# Patient Record
Sex: FEMALE | Race: BLACK OR AFRICAN AMERICAN | NOT HISPANIC OR LATINO | URBAN - METROPOLITAN AREA
[De-identification: names, ages, dates, MRNs, and addresses within clinical notes are randomized per-mention and may not be internally consistent; named-entity substitution may affect disease eponyms.]

---

## 2021-05-15 ENCOUNTER — INPATIENT (INPATIENT)
Facility: HOSPITAL | Age: 41
LOS: 4 days | Discharge: HOME | End: 2021-05-20
Attending: PSYCHIATRY & NEUROLOGY | Admitting: PSYCHIATRY & NEUROLOGY
Payer: MEDICAID

## 2021-05-15 VITALS
DIASTOLIC BLOOD PRESSURE: 83 MMHG | OXYGEN SATURATION: 99 % | TEMPERATURE: 99 F | SYSTOLIC BLOOD PRESSURE: 148 MMHG | RESPIRATION RATE: 15 BRPM | HEART RATE: 104 BPM

## 2021-05-15 PROCEDURE — 99285 EMERGENCY DEPT VISIT HI MDM: CPT

## 2021-05-15 NOTE — ED BEHAVIORAL HEALTH ASSESSMENT NOTE - RISK ASSESSMENT
Chronic risk factors:  psychosocial stressors; single. Protective factors: young; healthy;  no history of hospitalizations, no formal diagnosis; no suicide attempts; no self-injurious behavior; no hx of aggression/violence; no legal issues; motivated for help; articulate; strong family support; access to health services. No acute risk factors identified Low Acute Suicide Risk
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
## 2021-05-15 NOTE — ED PROVIDER NOTE - CLINICAL SUMMARY MEDICAL DECISION MAKING FREE TEXT BOX
Labs, Urine Tox/UA, EKG, CT head.    Psychiatric consultation called. 41 y/o F .  Labs, Urine Tox/UA, EKG, CT head.    Psychiatric consultation called. Medical evaluation performed. There is no clinical evidence of intoxication or any acute medical problem requiring immediate intervention. Patient is awaiting psychiatric consultation. Final disposition will be determined by psychiatrist.

## 2021-05-15 NOTE — ED BEHAVIORAL HEALTH ASSESSMENT NOTE - SUMMARY
Patient is a 40 year old , AA female, domiciled with her mother; noncaregiver; employed full time as a HHA; Denies PPH; no prior hospitalizations; no known suicide attempts; no known history of violence or arrests; no active substance abuse or known history of complicated withdrawal; Denies PMH; Patient was brought in by EMS, after she called 911 c/o spirits attacking her.  Patient with auditory hallucinations, olfactory hallucinations, paranoid and referential delusions with a decline is self care and daily functioning.  She denies SI/P/I, HI/P/I;  Pending full medical clearance for final determination of treatment. Patient is a 40 year old , AA female, domiciled with her mother; noncaregiver; employed full time as a HHA; Denies PPH; no prior hospitalizations; no known suicide attempts; no known history of violence or arrests; no active substance abuse or known history of complicated withdrawal; Denies PMH; Patient was brought in by EMS, after she called 911 c/o spirits attacking her.  Patient with auditory hallucinations, olfactory hallucinations, paranoid and referential delusions with a decline is self care and daily functioning.  She denies SI/P/I, HI/P/I;  Patient to be admitted on 9.13 for stabilization.

## 2021-05-15 NOTE — ED BEHAVIORAL HEALTH ASSESSMENT NOTE - HPI (INCLUDE ILLNESS QUALITY, SEVERITY, DURATION, TIMING, CONTEXT, MODIFYING FACTORS, ASSOCIATED SIGNS AND SYMPTOMS)
Patient is a 40 year old , AA female, domiciled with her mother; noncaregiver; employed full time as a HHA; Denies PPH; no prior hospitalizations; no known suicide attempts; no known history of violence or arrests; no active substance abuse or known history of complicated withdrawal; Denies PMH; Patient was brought in by EMS, after she called 911 c/o spirits attacking her.  On current evaluation, patient reports that for the past 4 years, spirits are attacking her.  Reports that the spirits are "choking my neck, pulling my neck and changing the shape of my collarbone, squeezing my forehead, my hair is locked up, they are raping me".  Patient reports that when she watches tv, the spirits watch with her and "talk about what I see".  She identifies them as a "loud group of voices", telling her she is ugly and has no money.  Reports that they also "smell foul" and are changing the shape of her body parts.  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  Reports she also can't shower because of them and the harassment.   Denies issues with eating or sleeping.  Reports anergia and feels hopeless in regards to "the spirits" and their harassment.    The patient denies depression or other significant mood symptoms.  Specifically, the patient denies manic symptoms, past and present.  The patient denies  visual hallucinations.  The patient denies suicidal ideation, homicidal ideation, intent, or plan.  Collateral: June, l/m, awaiting callback

## 2021-05-15 NOTE — ED ADULT NURSE NOTE - OBJECTIVE STATEMENT
Pt BIB EMS from home for auditory hallucinations x4 years.  Pt is alert and oriented and states that the spirits are attacking her, strangling her and can't breathe. Pt denies SI/HI, VH, drugs or ETOH.  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  Mom is aware that she is here.

## 2021-05-15 NOTE — ED BEHAVIORAL HEALTH ASSESSMENT NOTE - OTHER PAST PSYCHIATRIC HISTORY (INCLUDE DETAILS REGARDING ONSET, COURSE OF ILLNESS, INPATIENT/OUTPATIENT TREATMENT)
Reports no history of psychiatric treatment, hospitalizations, suicide attempts or self harming behaviors

## 2021-05-15 NOTE — ED PROVIDER NOTE - OBJECTIVE STATEMENT
41 y/o F BIBA secondary to auditorily  hallucinations and sensation that " spirits are choking me for 4 years" . Appears paranoid and internally preoccupied.  No evidence of physical injuries, broken skin or deformities.   Denies falling,  punching or kicking any objects. Denies pain ,SOB, fever , chills chest/ abdominal discomfort. Denies SI/HI/VH. .  Denies recent use of alcohol or illicit drugs. 41 y/o F BIBA secondary to auditorily  hallucinations and sensation that " spirits are choking me for 4 years" . Appears paranoid and internally preoccupied.  No evidence of physical injuries, broken skin or deformities.  States that her injuries are in her phones.  Denies falling,  punching or kicking any objects. Denies pain ,SOB, fever , chills chest/ abdominal discomfort. Denies SI/HI/VH. .  Denies recent use of alcohol or illicit drugs.

## 2021-05-15 NOTE — ED ADULT NURSE NOTE - NSFALLRSKASSESSTYPE_ED_ALL_ED
Add 52830 Cpt? (Important Note: In 2017 The Use Of 19256 Is Being Tracked By Cms To Determine Future Global Period Reimbursement For Global Periods): yes Body Location Override (Optional - Billing Will Still Be Based On Selected Body Map Location If Applicable): right posterior shoulder Detail Level: Detailed Initial (On Arrival)

## 2021-05-15 NOTE — ED BEHAVIORAL HEALTH ASSESSMENT NOTE - DESCRIPTION
denies 40 year old DAAF, domiciled with her mother; works f/t as a HHA; anxious; cooperative  Vital Signs Last 24 Hrs  T(C): 37 (15 May 2021 23:14), Max: 37 (15 May 2021 23:14)  T(F): 98.6 (15 May 2021 23:14), Max: 98.6 (15 May 2021 23:14)  HR: 104 (15 May 2021 23:14) (104 - 104)  BP: 148/83 (15 May 2021 23:14) (148/83 - 148/83)  BP(mean): --  RR: 15 (15 May 2021 23:14) (15 - 15)  SpO2: 99% (15 May 2021 23:14) (99% - 99%)

## 2021-05-15 NOTE — ED BEHAVIORAL HEALTH ASSESSMENT NOTE - CASE SUMMARY
Patient is a 40 year old , AA female, domiciled with her mother; noncaregiver; employed full time as a HHA; Denies PPH; no prior hospitalizations; no known suicide attempts; no known history of violence or arrests; no active substance abuse or known history of complicated withdrawal; Denies PMH; Patient was brought in by EMS, after she called 911 c/o spirits attacking her.  Patient with auditory hallucinations, olfactory hallucinations, paranoid and referential delusions with a decline is self care and daily functioning. HE believes that spirits are attacking her, and lately she hasn't been able to function, she stopped working and scared to take showers.  She denies SI/P/I, HI/P/I;  Patient to be admitted on 9.13 for stabilization.

## 2021-05-15 NOTE — ED PROVIDER NOTE - PROGRESS NOTE DETAILS
Thaddeus GUEVARA: Pt signed out to me.  She has been evaluated by psychiatry and will require admission.  Labs and CT reviewed, pt is medically cleared for psych admission.

## 2021-05-16 DIAGNOSIS — F29 UNSPECIFIED PSYCHOSIS NOT DUE TO A SUBSTANCE OR KNOWN PHYSIOLOGICAL CONDITION: ICD-10-CM

## 2021-05-16 LAB
ALBUMIN SERPL ELPH-MCNC: 4.5 G/DL — SIGNIFICANT CHANGE UP (ref 3.3–5)
ALP SERPL-CCNC: 69 U/L — SIGNIFICANT CHANGE UP (ref 40–120)
ALT FLD-CCNC: 15 U/L — SIGNIFICANT CHANGE UP (ref 4–33)
ANION GAP SERPL CALC-SCNC: 11 MMOL/L — SIGNIFICANT CHANGE UP (ref 7–14)
APPEARANCE UR: ABNORMAL
AST SERPL-CCNC: 15 U/L — SIGNIFICANT CHANGE UP (ref 4–32)
BASOPHILS # BLD AUTO: 0.02 K/UL — SIGNIFICANT CHANGE UP (ref 0–0.2)
BASOPHILS NFR BLD AUTO: 0.2 % — SIGNIFICANT CHANGE UP (ref 0–2)
BILIRUB SERPL-MCNC: 0.3 MG/DL — SIGNIFICANT CHANGE UP (ref 0.2–1.2)
BILIRUB UR-MCNC: NEGATIVE — SIGNIFICANT CHANGE UP
BUN SERPL-MCNC: 12 MG/DL — SIGNIFICANT CHANGE UP (ref 7–23)
CALCIUM SERPL-MCNC: 9.7 MG/DL — SIGNIFICANT CHANGE UP (ref 8.4–10.5)
CHLORIDE SERPL-SCNC: 103 MMOL/L — SIGNIFICANT CHANGE UP (ref 98–107)
CO2 SERPL-SCNC: 22 MMOL/L — SIGNIFICANT CHANGE UP (ref 22–31)
COLOR SPEC: YELLOW — SIGNIFICANT CHANGE UP
COVID-19 NUCLEOCAPSID GAM AB INTERP: NEGATIVE — SIGNIFICANT CHANGE UP
COVID-19 NUCLEOCAPSID TOTAL GAM ANTIBODY RESULT: 0.08 INDEX — SIGNIFICANT CHANGE UP
CREAT SERPL-MCNC: 0.72 MG/DL — SIGNIFICANT CHANGE UP (ref 0.5–1.3)
DIFF PNL FLD: ABNORMAL
EOSINOPHIL # BLD AUTO: 0.09 K/UL — SIGNIFICANT CHANGE UP (ref 0–0.5)
EOSINOPHIL NFR BLD AUTO: 0.9 % — SIGNIFICANT CHANGE UP (ref 0–6)
GLUCOSE SERPL-MCNC: 91 MG/DL — SIGNIFICANT CHANGE UP (ref 70–99)
GLUCOSE UR QL: NEGATIVE — SIGNIFICANT CHANGE UP
HCG SERPL-ACNC: <5 MIU/ML — SIGNIFICANT CHANGE UP
HCT VFR BLD CALC: 38.7 % — SIGNIFICANT CHANGE UP (ref 34.5–45)
HGB BLD-MCNC: 12.5 G/DL — SIGNIFICANT CHANGE UP (ref 11.5–15.5)
IANC: 7.2 K/UL — SIGNIFICANT CHANGE UP (ref 1.5–8.5)
IMM GRANULOCYTES NFR BLD AUTO: 0.2 % — SIGNIFICANT CHANGE UP (ref 0–1.5)
KETONES UR-MCNC: ABNORMAL
LEUKOCYTE ESTERASE UR-ACNC: NEGATIVE — SIGNIFICANT CHANGE UP
LYMPHOCYTES # BLD AUTO: 2.28 K/UL — SIGNIFICANT CHANGE UP (ref 1–3.3)
LYMPHOCYTES # BLD AUTO: 22.2 % — SIGNIFICANT CHANGE UP (ref 13–44)
MCHC RBC-ENTMCNC: 27.3 PG — SIGNIFICANT CHANGE UP (ref 27–34)
MCHC RBC-ENTMCNC: 32.3 GM/DL — SIGNIFICANT CHANGE UP (ref 32–36)
MCV RBC AUTO: 84.5 FL — SIGNIFICANT CHANGE UP (ref 80–100)
MONOCYTES # BLD AUTO: 0.65 K/UL — SIGNIFICANT CHANGE UP (ref 0–0.9)
MONOCYTES NFR BLD AUTO: 6.3 % — SIGNIFICANT CHANGE UP (ref 2–14)
NEUTROPHILS # BLD AUTO: 7.2 K/UL — SIGNIFICANT CHANGE UP (ref 1.8–7.4)
NEUTROPHILS NFR BLD AUTO: 70.2 % — SIGNIFICANT CHANGE UP (ref 43–77)
NITRITE UR-MCNC: NEGATIVE — SIGNIFICANT CHANGE UP
NRBC # BLD: 0 /100 WBCS — SIGNIFICANT CHANGE UP
NRBC # FLD: 0 K/UL — SIGNIFICANT CHANGE UP
PCP SPEC-MCNC: SIGNIFICANT CHANGE UP
PH UR: 6 — SIGNIFICANT CHANGE UP (ref 5–8)
PLATELET # BLD AUTO: 302 K/UL — SIGNIFICANT CHANGE UP (ref 150–400)
POTASSIUM SERPL-MCNC: 3.6 MMOL/L — SIGNIFICANT CHANGE UP (ref 3.5–5.3)
POTASSIUM SERPL-SCNC: 3.6 MMOL/L — SIGNIFICANT CHANGE UP (ref 3.5–5.3)
PROT SERPL-MCNC: 7.8 G/DL — SIGNIFICANT CHANGE UP (ref 6–8.3)
PROT UR-MCNC: ABNORMAL
RBC # BLD: 4.58 M/UL — SIGNIFICANT CHANGE UP (ref 3.8–5.2)
RBC # FLD: 13.2 % — SIGNIFICANT CHANGE UP (ref 10.3–14.5)
SARS-COV-2 IGG+IGM SERPL QL IA: 0.08 INDEX — SIGNIFICANT CHANGE UP
SARS-COV-2 IGG+IGM SERPL QL IA: NEGATIVE — SIGNIFICANT CHANGE UP
SARS-COV-2 RNA SPEC QL NAA+PROBE: SIGNIFICANT CHANGE UP
SODIUM SERPL-SCNC: 136 MMOL/L — SIGNIFICANT CHANGE UP (ref 135–145)
SP GR SPEC: 1.03 — HIGH (ref 1.01–1.02)
TOXICOLOGY SCREEN, DRUGS OF ABUSE, SERUM RESULT: SIGNIFICANT CHANGE UP
TSH SERPL-MCNC: 3.03 UIU/ML — SIGNIFICANT CHANGE UP (ref 0.27–4.2)
UROBILINOGEN FLD QL: SIGNIFICANT CHANGE UP
WBC # BLD: 10.26 K/UL — SIGNIFICANT CHANGE UP (ref 3.8–10.5)
WBC # FLD AUTO: 10.26 K/UL — SIGNIFICANT CHANGE UP (ref 3.8–10.5)

## 2021-05-16 PROCEDURE — 99222 1ST HOSP IP/OBS MODERATE 55: CPT

## 2021-05-16 PROCEDURE — 70450 CT HEAD/BRAIN W/O DYE: CPT | Mod: 26

## 2021-05-16 RX ORDER — BENZTROPINE MESYLATE 1 MG
0.5 TABLET ORAL
Refills: 0 | Status: DISCONTINUED | OUTPATIENT
Start: 2021-05-16 | End: 2021-05-17

## 2021-05-16 RX ORDER — HALOPERIDOL DECANOATE 100 MG/ML
2 INJECTION INTRAMUSCULAR ONCE
Refills: 0 | Status: DISCONTINUED | OUTPATIENT
Start: 2021-05-16 | End: 2021-05-20

## 2021-05-16 RX ORDER — RISPERIDONE 4 MG/1
0.5 TABLET ORAL
Refills: 0 | Status: DISCONTINUED | OUTPATIENT
Start: 2021-05-16 | End: 2021-05-17

## 2021-05-16 NOTE — BH INPATIENT PSYCHIATRY ASSESSMENT NOTE - NSBHASSESSSUMMFT_PSY_ALL_CORE
Patient is a 40 year old , AA woman, domiciled with her mother; non-caregiver; employed full time as a HHA; Denies PPH; no prior hospitalizations; no known suicide attempts; no known history of violence or arrests; no active history of substance use.  No known significant medical history. Patient was brought in by EMS, after she called 911 c/o spirits attacking her.  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  She feels that they are physically attacking her head and neck and they are sometimes "choking my neck, pulling my neck and changing the shape of my collarbone, squeezing my forehead, my hair is locked up, they are raping me".  Patient reports that when she watches tv, the spirits watch with her and "talk about what I see".  She identifies them as a "loud group of voices", telling her she is ugly and has no money.  Reports that they also "smell foul" and are changing the shape of her body parts.  Reports she also can't shower because of them and the harassment.   The patient denies depression or other significant mood symptoms.  Specifically, the patient denies manic symptoms, past and present.  The patient has auditory and tactile hallucinations (head being pushed) but denies visual hallucinations.  The patient denies suicidal ideation, homicidal ideation, intent, or plan. She is currently refusing to take any medications stating the she does not believe in pills.   Plan:  Begin Risperidone 0.5mg po BID  Cogentin 0.5mg po BID  Haldol 2mg/Ativan 1mg IM prn for agitation

## 2021-05-16 NOTE — PSYCHIATRIC REHAB INITIAL EVALUATION - NSBHEMPDATESFT_PSY_ALL_CORE
employed the last 4 years. Pt stated she felt overwhelmed last night and quit her job- as she needed to address the "spirits" bothering her.

## 2021-05-16 NOTE — ED BEHAVIORAL HEALTH NOTE - BEHAVIORAL HEALTH NOTE
COVID Exposure Screen- Patient  1.	*Have you had a COVID-19 test in the last 90 days?  (  ) Yes   (x  ) No   (  ) Unknown- Reason: _____  IF YES PROCEED TO QUESTION #2. IF NO OR UNKNOWN, PLEASE SKIP TO QUESTION #3.  2.	Date of test(s) and result(s): ________  3.	*Have you tested positive for COVID-19 antibodies? (  ) Yes   ( x ) No   (  ) Unknown- Reason: _____  IF YES PROCEED TO QUESTION #4. IF NO or UNKNOWN, PLEASE SKIP TO QUESTION #5.  4.	Date of positive antibody test: ________  5.	*Have you received 2 doses of the COVID-19 vaccine? (  ) Yes   (x  ) No   (  ) Unknown- Reason: _____   IF YES PROCEED TO QUESTION #6. IF NO or UNKNOWN, PLEASE SKIP TO QUESTION #7.  6.	Date of second dose: ________  7.	*In the past 10 days, have you been around anyone with a positive COVID-19 test?* (  ) Yes   ( x ) No   (  ) Unknown- Reason: ____  IF YES PROCEED TO QUESTION #8. IF NO or UNKNOWN, PLEASE SKIP TO QUESTION #13.  8.	Were you within 6 feet of them for at least 15 minutes? (  ) Yes   (  ) No   (  ) Unknown- Reason: _____  9.	Have you provided care for them? (  ) Yes   (  ) No   (  ) Unknown- Reason: ______  10.	Have you had direct physical contact with them (touched, hugged, or kissed them)? (  ) Yes   (  ) No    (  ) Unknown- Reason: _____  11.	Have you shared eating or drinking utensils with them? (  ) Yes   (  ) No    (  ) Unknown- Reason: ____  12.	Have they sneezed, coughed, or somehow gotten respiratory droplets on you? (  ) Yes   (  ) No    (  ) Unknown- Reason: ______  13.	*Have you been out of [place] within the past 10 days?* (  ) Yes   (x  ) No   (  ) Unknown- Reason: _____  IF YES PLEASE ANSWER THE FOLLOWING QUESTIONS:  14.	Which state/country have you been to? ______  15.	Were you there over 24 hours? (  ) Yes   (  ) No    (  ) Unknown- Reason: ______  16.	Date of return to [facility]: ______     COVID Exposure Screen- collateral (i.e. third-party, chart review, belongings, etc; include EMS and ED staff)  1.	*Has the patient had a COVID-19 test in the last 90 days?  (  ) Yes   (  ) No   (  ) Unknown- Reason: _____  IF YES PROCEED TO QUESTION #2. IF NO OR UNKNOWN, PLEASE SKIP TO QUESTION #3.  2.	Date of test(s) and result(s): ________  3.	*Has the patient tested positive for COVID-19 antibodies? (  ) Yes   (  ) No   (  ) Unknown- Reason: _____  IF YES PROCEED TO QUESTION #4. IF NO or UNKNOWN, PLEASE SKIP TO QUESTION #5.  4.	Date of positive antibody test: ________  5.	*Has the patient received 2 doses of the COVID-19 vaccine? (  ) Yes   (  ) No   (  ) Unknown- Reason: _____  IF YES PROCEED TO QUESTION #6. IF NO or UNKNOWN, PLEASE SKIP TO QUESTION #7.  6.	 Date of second dose: ________  7.	*In the past 10 days, has the patient been around anyone with a positive COVID-19 test?* (  ) Yes   (  ) No   (  ) Unknown- Reason: __  IF YES PROCEED TO QUESTION #8. IF NO or UNKNOWN, PLEASE SKIP TO QUESTION #13.  8.	Was the patient within 6 feet of them for at least 15 minutes? (  ) Yes   (  ) No   (  ) Unknown- Reason: _____  9.	Did the patient provide care for them? (  ) Yes   (  ) No   (  ) Unknown- Reason: ______  10.	Did the patient have direct physical contact with them (touched, hugged, or kissed them)? (  ) Yes   (  ) No    (  ) Unknown- Reason: __  11.	Did the patient share eating or drinking utensils with them? (  ) Yes   (  ) No    (  ) Unknown- Reason: ____  12.	Did they sneeze, cough, or somehow get respiratory droplets on the patient? (  ) Yes   (  ) No    (  ) Unknown- Reason: ______  13.	*Has the patient been out of [place] within the past 10 days?* (  ) Yes   (  ) No   (  ) Unknown- Reason: _____  IF YES PLEASE ANSWER THE FOLLOWING QUESTIONS:  14.	Which state/country did they go to? ______  15.	Were they there over 24 hours? (  ) Yes   (  ) No    (  ) Unknown- Reason: ______  16.	Date of return to [facility]: ______

## 2021-05-16 NOTE — ED ADULT NURSE REASSESSMENT NOTE - NS ED NURSE REASSESS COMMENT FT1
Pt resting comfortably; awaiting medical test results, denies complaints.
Break coverage RN. Pt resting in bed appears to be comfortable. Respirations even and unlabored, offering no complaints at this time. Will continue to monitor.

## 2021-05-16 NOTE — BH INPATIENT PSYCHIATRY ASSESSMENT NOTE - HPI (INCLUDE ILLNESS QUALITY, SEVERITY, DURATION, TIMING, CONTEXT, MODIFYING FACTORS, ASSOCIATED SIGNS AND SYMPTOMS)
Patient is a 40 year old , AA woman, domiciled with her mother; non-caregiver; employed full time as a HHA; Denies PPH; no prior hospitalizations; no known suicide attempts; no known history of violence or arrests; no active history of substance use.  No known significant medical history. Patient was brought in by EMS, after she called 911 c/o spirits attacking her.  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 She admits that she has been attacked by spirits for the past four years and had been speaking to her Rodriguez about this but he had told her not to tell anyone about this. She feels that they are physically attacking her head and neck and they are sometimes "choking my neck, pulling my neck and changing the shape of my collarbone, squeezing my forehead, my hair is locked up, they are raping me".  Patient reports that when she watches tv, the spirits watch with her and "talk about what I see".  She identifies them as a "loud group of voices", telling her she is ugly and has no money.  Reports that they also "smell foul" and are changing the shape of her body parts.   Reports she also can't shower because of them and the harassment.   The patient denies depression or other significant mood symptoms.  Specifically, the patient denies manic symptoms, past and present.  The patient has auditory and tactile hallucinations (head being pushed) but denies visual hallucinations.  The patient denies suicidal ideation, homicidal ideation, intent, or plan. She is currently refusing to take any medications stating the she does not believe in pills.

## 2021-05-16 NOTE — BH INPATIENT PSYCHIATRY ASSESSMENT NOTE - NSTREATMENTCERTY_PSY_ALL_CORE
detailed exam That inpatient services furnished since the previous certification or recertification were, and continue to be required: for treatment that could reasonably be expected to improve the patient's condition; or, for diagnostic study;    That the hospital records show that the services furnished were: intensive treatment services; admission and related services necessary for diagnostic study or equivalent services;    That the patient continues to need, on a daily basis active inpatient treatment furnished directly by or requiring the supervision of inpatient psychiatric facility personnel.

## 2021-05-16 NOTE — BH PATIENT PROFILE - NSPROPATIENTLACTATING_GEN_A_NUR
CERTIFICATE OF WORK    April 11, 2019      Re:   Molly Solis  [id number] [facility] [id number]                        This is to certify that Molly Solis has been under my care.  She may return to work for 4 hour shifts on April 29, 2019.  She may return to work for 6 hour shifts on May 6, 2019. She may return to her regular work schedule on May 13, 2019.  Any questions, please contact my office.                SIGNATURE:___________________________________________,   4/11/2019      MD Lillie Garcia MD  [facility]  [address] [place] [address]  [id number] [phone]   no

## 2021-05-16 NOTE — BH INPATIENT PSYCHIATRY ASSESSMENT NOTE - CURRENT MEDICATION
MEDICATIONS  (STANDING):  benztropine 0.5 milliGRAM(s) Oral two times a day  risperiDONE   Tablet 0.5 milliGRAM(s) Oral two times a day    MEDICATIONS  (PRN):  haloperidol    Injectable 2 milliGRAM(s) IntraMuscular once PRN Agitation  LORazepam     Tablet 1 milliGRAM(s) Oral every 6 hours PRN Anxiety  LORazepam   Injectable 1 milliGRAM(s) IntraMuscular once PRN Agitation

## 2021-05-16 NOTE — BH INPATIENT PSYCHIATRY ASSESSMENT NOTE - NSDCCRITERIA_PSY_ALL_CORE
Patient will be free of auditory hallucinations and delusions and will be able to take her medications.
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
## 2021-05-17 PROCEDURE — 99232 SBSQ HOSP IP/OBS MODERATE 35: CPT

## 2021-05-17 RX ORDER — ARIPIPRAZOLE 15 MG/1
5 TABLET ORAL DAILY
Refills: 0 | Status: DISCONTINUED | OUTPATIENT
Start: 2021-05-18 | End: 2021-05-20

## 2021-05-17 NOTE — BH SOCIAL WORK INITIAL PSYCHOSOCIAL EVALUATION - OTHER PAST PSYCHIATRIC HISTORY (INCLUDE DETAILS REGARDING ONSET, COURSE OF ILLNESS, INPATIENT/OUTPATIENT TREATMENT)
Patient is a 40 year old  female who lives with her mother, Kelly Vernon, [phone].  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  Patient has no previous psychiatric history but reports that she has been hearing voices for four years and told her Rodriguez who told her not to speak about it.  She has no treatment history.  The voices have been getting worse and they are interfering with her ability to take care of herself.  She is afraid to shower, has been unable to work, and has poor energy and hopelessness surrounding these voices.  They tell her that she is ugly and has no money.  These voices, or spirits, smell foul and are changing her body and she feels attacked by them.  Patient is fearful of these spirits.  She has no history of substance abuse, suicidal ideation, suicide attempts, violence or legal issues.  The patient's insurance information is not listed and writer let the unit social workers and Medicaid Coordinators know. GOAL: Pt will perform ALL transfers (I), w/use of appropriate assistive device as needed, in 2 weeks.

## 2021-05-18 PROCEDURE — 90853 GROUP PSYCHOTHERAPY: CPT

## 2021-05-18 PROCEDURE — 99232 SBSQ HOSP IP/OBS MODERATE 35: CPT | Mod: 25

## 2021-05-18 NOTE — BH PSYCHOLOGY - GROUP THERAPY NOTE - NSPSYCHOLGRPDBTGOAL_PSY_A_CORE
reduce mood and affective lability/improve ability to indentify feelings/improve ability to communicate feelings/reduce vulnerability to emotional dysregualation

## 2021-05-18 NOTE — BH DISCHARGE NOTE NURSING/SOCIAL WORK/PSYCH REHAB - NSDCPRGOAL_PSY_ALL_CORE
Pt made some progress in her coping skills to manage symptoms for her Psychosis, unspecified Dx. Pt was pleasant and not a behavioral issue. Pt has started to open up and think dialectically about other reasons for her symptoms other than just a spiritual/cultural context. Pt is still endorsing AH and tactile hallucinations. Pt appears less anxious with less constricted affect. Pt stated her mood is good. Pt is goal oriented and has large social support system. Pt and writer started to work on reality testing and it is still work in progress. Pt was able to listen and be receptive. Pt attended minimal group therapy due to symptoms but was receptive to the handouts. Pt is willing to following with Outpatient Care/ETP. Pt identified multiple coping skills that she will use to manage symptoms and circumvent hospitalization. Pt denied SI/HI and identified protective factors such as Scientology/family.

## 2021-05-18 NOTE — BH PSYCHOLOGY - GROUP THERAPY NOTE - NSPSYCHOLGRPDBTPT_PSY_A_CORE FT
DBT Group is a group in which patients learn skills to better manage their emotions and behaviors. Group begins with a mindfulness practice so that patients have an opportunity to practice observing their internal and external experiences.  Following the mindfulness exercise the group learns new skills in a didactic format. Group today focused on the “emotion regulation” module.  Specifically, patients learned the skill of Accumulating Positive Emotions in the Long Term by identifying values and translating those into goals and manageable action steps. Patients shared values that are important to them and how these translate into goals, as well as how they’ve begun to implement these.

## 2021-05-18 NOTE — BH DISCHARGE NOTE NURSING/SOCIAL WORK/PSYCH REHAB - NSCDUDCCRISIS_PSY_A_CORE
[facility]  1 (143) [facility]-WELL ([phone])  Text "WELL" to 39646  Website: www.[employer]/.[facility] 1 (960) 721-TXSW (2697) Website: www.safehorizon.org/.National Suicide Prevention Lifeline 2 (141) 227-4226/.  [facility]  1 (983) LIFENET (242-2922)/.  [facility]  [address] [place] [phone]   Hours:  Monday through Friday from 9 AM to 3 PM/.  [employer] - [facility] Crisis Line  5 [phone], Option 1

## 2021-05-18 NOTE — BH DISCHARGE NOTE NURSING/SOCIAL WORK/PSYCH REHAB - NSDCPRRECOMMEND_PSY_ALL_CORE
Pt would benefit from following up with Outpatient care/ETP and keep utilizing coping skills to mange symptoms as discussed.

## 2021-05-18 NOTE — CHART NOTE - NSCHARTNOTEFT_GEN_A_CORE
Nutrition note:  Consult ordered d/t decreased appetite.  Patient admitted to [facility] admitted with psychosis.  Diet: none ordered.  Medications: Abilify.  Labs: 5/16 Cbc + Cmp wnl.  Ht: 67" Wt: 145# BMI: 22.7  Patient endorses good appetite. Denies any GI distress. Does not know usual weight. Did not have any specific food preferences.   Nutrition assessment not indicated at current time.  Plan: Recommend regular diet be ordered.  RDN remains available prn.  Fannie Rizo MS RDN  Pager #[phone] Tried to call patient's mother. No answer. Left voicemail.

## 2021-05-18 NOTE — BH DISCHARGE NOTE NURSING/SOCIAL WORK/PSYCH REHAB - PATIENT PORTAL LINK FT
You can access the FollowMyHealth Patient Portal offered by [facility] by registering at the following website: [email or web address]. By joining [email or web address]’s FollowMyHealth portal, you will also be able to view your health information using other applications (apps) compatible with our system.

## 2021-05-18 NOTE — BH DISCHARGE NOTE NURSING/SOCIAL WORK/PSYCH REHAB - DISCHARGE INSTRUCTIONS AFTERCARE APPOINTMENTS
In order to check the location, date, or time of your aftercare appointment, please refer to your Discharge Instructions Document given to you upon leaving the hospital.  If you have lost the instructions please call [phone]

## 2021-05-19 PROCEDURE — 99232 SBSQ HOSP IP/OBS MODERATE 35: CPT

## 2021-05-20 VITALS — DIASTOLIC BLOOD PRESSURE: 77 MMHG | SYSTOLIC BLOOD PRESSURE: 136 MMHG | TEMPERATURE: 98 F | HEART RATE: 68 BPM

## 2021-05-20 NOTE — BH INPATIENT PSYCHIATRY PROGRESS NOTE - NSICDXBHPRIMARYDX_PSY_ALL_CORE
Psychosis, unspecified psychosis type   F29  

## 2021-05-20 NOTE — BH INPATIENT PSYCHIATRY PROGRESS NOTE - NSBHASSESSSUMMFT_PSY_ALL_CORE
The patient continues to be psychotic, with delusions and hallucinations.  The patient is refusing medications despite education.  -Continue to observe, and provide education  -Offer Abilify  -Obtain collateral information
The patient continues to be psychotic, with delusions and hallucinations.  The patient is refusing medications despite education.  -Continue to observe, and provide education  -Offer Abilify  -Patient submitted 3 day letter
The patient continues to be psychotic, with delusions and hallucinations.  The patient is refusing medications despite education.  Mood has been good, no SI, and her behavior has been well controlled.  -Continue to observe, and provide education  -Offer Abilify  -Patient submitted 3 day letter
The patient has continued to deny any mod symptoms.  No depression, anxiety, or SI.  Her behavior has been well controlled throughout the hospitalization.  The patient has been sleeping well.  The patient continues to have psychotic symptoms, with hallucinations and delusional thinking.  However, according to the patient’s mother, at least some of her experience may be culturally related and acceptable.  The patient denies any concerns returning home, and is future-oriented.  The patient has been engaged in treatment on the unit and is agreeable to follow up as an outpatient.  The patient has support from her family, who are also protective factors for her.  She was able to safety plan appropriately.  She is no longer an acute danger to herself or others, and will therefore be discharged on her 3-day letter.  -Discharge patient on 3 day letter

## 2021-05-20 NOTE — BH INPATIENT PSYCHIATRY PROGRESS NOTE - NSTXDISORGGOAL_PSY_ALL_CORE
Will verbalize decreased distress related to disturbance of thinking to 2 on a 10-point scale

## 2021-05-20 NOTE — BH INPATIENT PSYCHIATRY PROGRESS NOTE - NSBHCONSULTIPREASON_PSY_A_CORE
danger to self; mental illness expected to respond to inpatient care

## 2021-05-20 NOTE — BH INPATIENT PSYCHIATRY DISCHARGE NOTE - OTHER PAST PSYCHIATRIC HISTORY (INCLUDE DETAILS REGARDING ONSET, COURSE OF ILLNESS, INPATIENT/OUTPATIENT TREATMENT)
Patient is a 40 year old  female who lives with her mother, Kelly Vernon, [phone].  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  Patient has no previous psychiatric history but reports that she has been hearing voices for four years and told her Rodriguez who told her not to speak about it.  She has no treatment history.  The voices have been getting worse and they are interfering with her ability to take care of herself.  She is afraid to shower, has been unable to work, and has poor energy and hopelessness surrounding these voices.  They tell her that she is ugly and has no money.  These voices, or spirits, smell foul and are changing her body and she feels attacked by them.  Patient is fearful of these spirits.  She has no history of substance abuse, suicidal ideation, suicide attempts, violence or legal issues.  The patient's insurance information is not listed and writer let the unit social workers and Medicaid Coordinators know.
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
## 2021-05-20 NOTE — BH INPATIENT PSYCHIATRY PROGRESS NOTE - MSE UNSTRUCTURED FT
The patient appears stated age, fair hygiene, dressed appropriately.  She was calm, cooperative with the interview.  She maintained appropriate eye contact.  No psychomotor agitation or retardation.  Steady gait.  The patient’s speech was fluent, normal in tone, rate, and volume.  The patient’s mood is “good.”  Affect is constricted, but more reactive, stable, appropriate.  The patient’s thoughts are goal directed.  She continues to have delusional thinking and auditory and tactile hallucinations.  She denies any suicidal or homicidal ideation, intent, or plan.  Insight is poor.  Judgment is impaired.  Impulse control has been fair on the unit.
The patient appears stated age, fair hygiene, dressed appropriately.  She was calm, cooperative with the interview.  She maintained appropriate eye contact.  No psychomotor agitation or retardation.  Steady gait.  The patient’s speech was fluent, normal in tone, rate, and volume.  The patient’s mood is “okay.”  Affect is constricted, stable, appropriate.  The patient’s thoughts are goal directed.  She continues to have delusional thinking and auditory and tactile hallucinations.  She denies any suicidal or homicidal ideation, intent, or plan.  Insight is poor.  Judgment is impaired.  Impulse control has been fair on the unit.
The patient appears stated age, fair hygiene, dressed appropriately.  She was calm, cooperative with the interview.  She maintained appropriate eye contact.  No psychomotor agitation or retardation.  Steady gait.  The patient’s speech was fluent, normal in tone, rate, and volume.  The patient’s mood is “good.”  Affect is constricted, but more reactive, stable, appropriate.  The patient’s thoughts are goal directed.  She continues to have delusional thinking and auditory and tactile hallucinations.  She denies any suicidal or homicidal ideation, intent, or plan.  Insight is poor.  Judgment is impaired.  Impulse control has been fair on the unit.
The patient appears stated age, fair hygiene, dressed appropriately.  She was calm, cooperative with the interview.  She maintained appropriate eye contact.  No psychomotor agitation or retardation.  Steady gait.  The patient’s speech was fluent, normal in tone, rate, and volume.  The patient’s mood is “okay.”  Affect is constricted, stable, appropriate.  The patient’s thoughts are goal directed.  She continues to have delusional thinking and auditory and tactile hallucinations.  She denies any suicidal or homicidal ideation, intent, or plan.  Insight is poor.  Judgment is impaired.  Impulse control has been fair on the unit.

## 2021-05-20 NOTE — BH INPATIENT PSYCHIATRY PROGRESS NOTE - NSBHMETABOLICLABS_PSY_ALL_CORE
Metabolic screening could not be completed due to the patient's enduring unstable medical/psychological condition

## 2021-05-20 NOTE — BH INPATIENT PSYCHIATRY PROGRESS NOTE - NSBHMETABOLIC_PSY_ALL_CORE_FT
BMI:   HbA1c:   Glucose:   BP: 139/89 (05-18-21 @ 06:29) (139/89 - 148/83)  Lipid Panel: 
BMI:   HbA1c:   Glucose:   BP: 148/83 (05-15-21 @ 23:14) (148/83 - 148/83)  Lipid Panel: 
BMI:   HbA1c:   Glucose:   BP: 146/78 (05-19-21 @ 08:08) (139/89 - 146/78)  Lipid Panel: 
BMI:   HbA1c:   Glucose:   BP: 136/77 (05-20-21 @ 06:44) (136/77 - 146/78)  Lipid Panel:

## 2021-05-20 NOTE — BH INPATIENT PSYCHIATRY DISCHARGE NOTE - NSDCCPCAREPLAN_GEN_ALL_CORE_FT
PRINCIPAL DISCHARGE DIAGNOSIS  Diagnosis: Psychosis, unspecified psychosis type  Assessment and Plan of Treatment:

## 2021-05-20 NOTE — BH INPATIENT PSYCHIATRY PROGRESS NOTE - CURRENT MEDICATION
MEDICATIONS  (STANDING):  ARIPiprazole 5 milliGRAM(s) Oral daily    MEDICATIONS  (PRN):  haloperidol    Injectable 2 milliGRAM(s) IntraMuscular once PRN Agitation  LORazepam     Tablet 1 milliGRAM(s) Oral every 6 hours PRN Anxiety  LORazepam   Injectable 1 milliGRAM(s) IntraMuscular once PRN Agitation  
MEDICATIONS  (STANDING):  ARIPiprazole 5 milliGRAM(s) Oral daily    MEDICATIONS  (PRN):  haloperidol    Injectable 2 milliGRAM(s) IntraMuscular once PRN Agitation  LORazepam     Tablet 1 milliGRAM(s) Oral every 6 hours PRN Anxiety  LORazepam   Injectable 1 milliGRAM(s) IntraMuscular once PRN Agitation  
MEDICATIONS  (STANDING):    MEDICATIONS  (PRN):  haloperidol    Injectable 2 milliGRAM(s) IntraMuscular once PRN Agitation  LORazepam     Tablet 1 milliGRAM(s) Oral every 6 hours PRN Anxiety  LORazepam   Injectable 1 milliGRAM(s) IntraMuscular once PRN Agitation  
MEDICATIONS  (STANDING):  ARIPiprazole 5 milliGRAM(s) Oral daily    MEDICATIONS  (PRN):  haloperidol    Injectable 2 milliGRAM(s) IntraMuscular once PRN Agitation  LORazepam     Tablet 1 milliGRAM(s) Oral every 6 hours PRN Anxiety  LORazepam   Injectable 1 milliGRAM(s) IntraMuscular once PRN Agitation

## 2021-05-20 NOTE — BH INPATIENT PSYCHIATRY PROGRESS NOTE - NSBHINPTBILLING_PSY_ALL_CORE
59548 - Inpatient Moderate Complexity
54887 - Hospital Discharge Day Management; 30 min or less
89081 - Inpatient Moderate Complexity
64146 - Inpatient Moderate Complexity
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
## 2021-05-20 NOTE — BH INPATIENT PSYCHIATRY PROGRESS NOTE - NSTXPSYCHOGOAL_PSY_ALL_CORE
Will identify 2 coping skills that help mitigate hallucinations

## 2021-05-20 NOTE — BH INPATIENT PSYCHIATRY PROGRESS NOTE - NSBHCHARTREVIEWVS_PSY_A_CORE FT
Vital Signs Last 24 Hrs  T(C): 36.7 (18 May 2021 06:29), Max: 36.7 (18 May 2021 06:29)  T(F): 98 (18 May 2021 06:29), Max: 98 (18 May 2021 06:29)  HR: 79 (18 May 2021 06:29) (79 - 79)  BP: 139/89 (18 May 2021 06:29) (139/89 - 139/89)  BP(mean): --  RR: 17 (18 May 2021 06:29) (17 - 17)  SpO2: --
Vital Signs Last 24 Hrs  T(C): 36.7 (20 May 2021 06:44), Max: 36.8 (19 May 2021 21:08)  T(F): 98 (20 May 2021 06:44), Max: 98.3 (19 May 2021 21:08)  HR: 68 (20 May 2021 06:44) (68 - 68)  BP: 136/77 (20 May 2021 06:44) (136/77 - 136/77)  BP(mean): --  RR: --  SpO2: --
Vital Signs Last 24 Hrs  T(C): 36.6 (17 May 2021 07:55), Max: 37.2 (16 May 2021 20:35)  T(F): 97.8 (17 May 2021 07:55), Max: 98.9 (16 May 2021 20:35)  HR: --  BP: --  BP(mean): --  RR: --  SpO2: --
Vital Signs Last 24 Hrs  T(C): 36.7 (19 May 2021 08:08), Max: 37.2 (18 May 2021 20:20)  T(F): 98.1 (19 May 2021 08:08), Max: 98.9 (18 May 2021 20:20)  HR: 67 (19 May 2021 08:08) (67 - 67)  BP: 146/78 (19 May 2021 08:08) (146/78 - 146/78)  BP(mean): --  RR: --  SpO2: --

## 2021-05-20 NOTE — BH INPATIENT PSYCHIATRY PROGRESS NOTE - PRN MEDS
MEDICATIONS  (PRN):  haloperidol    Injectable 2 milliGRAM(s) IntraMuscular once PRN Agitation  LORazepam     Tablet 1 milliGRAM(s) Oral every 6 hours PRN Anxiety  LORazepam   Injectable 1 milliGRAM(s) IntraMuscular once PRN Agitation  

## 2021-06-02 NOTE — SOCIAL WORK POST DISCHARGE FOLLOW UP NOTE - NSBHSWFOLLOWUP_PSY_ALL_CORE_FT
SW has made several calls to speak with pt to request if she was interested assistance with rescheduling for appt. SW has advised the social work department to send a letter to pt house. At this time, treatment team have confirmed pt was d/c stable to the community, have not recommended mobile crisis call.

## 2021-06-10 NOTE — SOCIAL WORK POST DISCHARGE FOLLOW UP NOTE - NSBHSWFOLLOWUP_PSY_ALL_CORE_FT
SW has not hear back from the pt with respect to rescheduling outpatient mental health clinic appt. SW has confirmed with the team that pt was stable at the time of discharge. JASE is closing case at this time.

## 2024-01-30 NOTE — BH INPATIENT PSYCHIATRY ASSESSMENT NOTE - NSBHCHARTREVIEWVS_PSY_A_CORE FT
Yes
Vital Signs Last 24 Hrs  T(C): 37 (15 May 2021 23:14), Max: 37 (15 May 2021 23:14)  T(F): 98.6 (15 May 2021 23:14), Max: 98.6 (15 May 2021 23:14)  HR: 104 (15 May 2021 23:14) (104 - 104)  BP: 148/83 (15 May 2021 23:14) (148/83 - 148/83)  BP(mean): --  RR: 15 (15 May 2021 23:14) (15 - 15)  SpO2: 99% (15 May 2021 23:14) (99% - 99%)
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
